# Patient Record
Sex: FEMALE | Race: OTHER | URBAN - METROPOLITAN AREA
[De-identification: names, ages, dates, MRNs, and addresses within clinical notes are randomized per-mention and may not be internally consistent; named-entity substitution may affect disease eponyms.]

---

## 2021-12-16 ENCOUNTER — EMERGENCY (EMERGENCY)
Facility: HOSPITAL | Age: 31
LOS: 0 days | Discharge: HOME | End: 2021-12-16
Attending: STUDENT IN AN ORGANIZED HEALTH CARE EDUCATION/TRAINING PROGRAM | Admitting: STUDENT IN AN ORGANIZED HEALTH CARE EDUCATION/TRAINING PROGRAM
Payer: COMMERCIAL

## 2021-12-16 VITALS
OXYGEN SATURATION: 99 % | DIASTOLIC BLOOD PRESSURE: 84 MMHG | HEART RATE: 78 BPM | RESPIRATION RATE: 18 BRPM | SYSTOLIC BLOOD PRESSURE: 118 MMHG | TEMPERATURE: 98 F | WEIGHT: 235.01 LBS

## 2021-12-16 VITALS
RESPIRATION RATE: 17 BRPM | OXYGEN SATURATION: 99 % | DIASTOLIC BLOOD PRESSURE: 81 MMHG | HEART RATE: 75 BPM | SYSTOLIC BLOOD PRESSURE: 121 MMHG

## 2021-12-16 DIAGNOSIS — R10.31 RIGHT LOWER QUADRANT PAIN: ICD-10-CM

## 2021-12-16 DIAGNOSIS — R10.32 LEFT LOWER QUADRANT PAIN: ICD-10-CM

## 2021-12-16 DIAGNOSIS — F17.200 NICOTINE DEPENDENCE, UNSPECIFIED, UNCOMPLICATED: ICD-10-CM

## 2021-12-16 DIAGNOSIS — R11.2 NAUSEA WITH VOMITING, UNSPECIFIED: ICD-10-CM

## 2021-12-16 LAB
ALBUMIN SERPL ELPH-MCNC: 4 G/DL — SIGNIFICANT CHANGE UP (ref 3.5–5.2)
ALP SERPL-CCNC: 56 U/L — SIGNIFICANT CHANGE UP (ref 30–115)
ALT FLD-CCNC: 12 U/L — SIGNIFICANT CHANGE UP (ref 0–41)
ANION GAP SERPL CALC-SCNC: 15 MMOL/L — HIGH (ref 7–14)
APPEARANCE UR: CLEAR — SIGNIFICANT CHANGE UP
AST SERPL-CCNC: 13 U/L — SIGNIFICANT CHANGE UP (ref 0–41)
BASOPHILS # BLD AUTO: 0.09 K/UL — SIGNIFICANT CHANGE UP (ref 0–0.2)
BASOPHILS NFR BLD AUTO: 0.8 % — SIGNIFICANT CHANGE UP (ref 0–1)
BILIRUB SERPL-MCNC: 0.3 MG/DL — SIGNIFICANT CHANGE UP (ref 0.2–1.2)
BILIRUB UR-MCNC: NEGATIVE — SIGNIFICANT CHANGE UP
BUN SERPL-MCNC: 10 MG/DL — SIGNIFICANT CHANGE UP (ref 10–20)
CALCIUM SERPL-MCNC: 9.3 MG/DL — SIGNIFICANT CHANGE UP (ref 8.5–10.1)
CHLORIDE SERPL-SCNC: 105 MMOL/L — SIGNIFICANT CHANGE UP (ref 98–110)
CO2 SERPL-SCNC: 18 MMOL/L — SIGNIFICANT CHANGE UP (ref 17–32)
COLOR SPEC: COLORLESS — SIGNIFICANT CHANGE UP
CREAT SERPL-MCNC: 0.6 MG/DL — LOW (ref 0.7–1.5)
DIFF PNL FLD: NEGATIVE — SIGNIFICANT CHANGE UP
EOSINOPHIL # BLD AUTO: 0.25 K/UL — SIGNIFICANT CHANGE UP (ref 0–0.7)
EOSINOPHIL NFR BLD AUTO: 2.1 % — SIGNIFICANT CHANGE UP (ref 0–8)
GLUCOSE SERPL-MCNC: 87 MG/DL — SIGNIFICANT CHANGE UP (ref 70–99)
GLUCOSE UR QL: NEGATIVE — SIGNIFICANT CHANGE UP
HCT VFR BLD CALC: 38.1 % — SIGNIFICANT CHANGE UP (ref 37–47)
HGB BLD-MCNC: 12.8 G/DL — SIGNIFICANT CHANGE UP (ref 12–16)
IMM GRANULOCYTES NFR BLD AUTO: 0.4 % — HIGH (ref 0.1–0.3)
KETONES UR-MCNC: NEGATIVE — SIGNIFICANT CHANGE UP
LEUKOCYTE ESTERASE UR-ACNC: NEGATIVE — SIGNIFICANT CHANGE UP
LYMPHOCYTES # BLD AUTO: 27.8 % — SIGNIFICANT CHANGE UP (ref 20.5–51.1)
LYMPHOCYTES # BLD AUTO: 3.31 K/UL — SIGNIFICANT CHANGE UP (ref 1.2–3.4)
MCHC RBC-ENTMCNC: 31.4 PG — HIGH (ref 27–31)
MCHC RBC-ENTMCNC: 33.6 G/DL — SIGNIFICANT CHANGE UP (ref 32–37)
MCV RBC AUTO: 93.4 FL — SIGNIFICANT CHANGE UP (ref 81–99)
MONOCYTES # BLD AUTO: 0.69 K/UL — HIGH (ref 0.1–0.6)
MONOCYTES NFR BLD AUTO: 5.8 % — SIGNIFICANT CHANGE UP (ref 1.7–9.3)
NEUTROPHILS # BLD AUTO: 7.53 K/UL — HIGH (ref 1.4–6.5)
NEUTROPHILS NFR BLD AUTO: 63.1 % — SIGNIFICANT CHANGE UP (ref 42.2–75.2)
NITRITE UR-MCNC: NEGATIVE — SIGNIFICANT CHANGE UP
NRBC # BLD: 0 /100 WBCS — SIGNIFICANT CHANGE UP (ref 0–0)
PH UR: 6.5 — SIGNIFICANT CHANGE UP (ref 5–8)
PLATELET # BLD AUTO: 546 K/UL — HIGH (ref 130–400)
POTASSIUM SERPL-MCNC: 4.3 MMOL/L — SIGNIFICANT CHANGE UP (ref 3.5–5)
POTASSIUM SERPL-SCNC: 4.3 MMOL/L — SIGNIFICANT CHANGE UP (ref 3.5–5)
PROT SERPL-MCNC: 7.2 G/DL — SIGNIFICANT CHANGE UP (ref 6–8)
PROT UR-MCNC: NEGATIVE — SIGNIFICANT CHANGE UP
RBC # BLD: 4.08 M/UL — LOW (ref 4.2–5.4)
RBC # FLD: 13.1 % — SIGNIFICANT CHANGE UP (ref 11.5–14.5)
SODIUM SERPL-SCNC: 138 MMOL/L — SIGNIFICANT CHANGE UP (ref 135–146)
SP GR SPEC: 1.01 — SIGNIFICANT CHANGE UP (ref 1.01–1.03)
UROBILINOGEN FLD QL: SIGNIFICANT CHANGE UP
WBC # BLD: 11.92 K/UL — HIGH (ref 4.8–10.8)
WBC # FLD AUTO: 11.92 K/UL — HIGH (ref 4.8–10.8)

## 2021-12-16 PROCEDURE — 76830 TRANSVAGINAL US NON-OB: CPT | Mod: 26

## 2021-12-16 PROCEDURE — 74177 CT ABD & PELVIS W/CONTRAST: CPT | Mod: 26,MA

## 2021-12-16 PROCEDURE — 99285 EMERGENCY DEPT VISIT HI MDM: CPT

## 2021-12-16 RX ORDER — KETOROLAC TROMETHAMINE 30 MG/ML
15 SYRINGE (ML) INJECTION ONCE
Refills: 0 | Status: DISCONTINUED | OUTPATIENT
Start: 2021-12-16 | End: 2021-12-16

## 2021-12-16 RX ADMIN — Medication 15 MILLIGRAM(S): at 10:18

## 2021-12-16 NOTE — ED PROVIDER NOTE - NS ED ROS FT
Constitutional: Negative for fever, chills, and fatigue.  HENT: Negative for headache  Cardiovascular: Negative for chest pain, and palpitation.  Respiratory: Negative for SOB, .  Gastrointestinal: + N/V/ abdominal pain. Negative for diarrhea, hematochezia, and melena.  Genitourinary: + B/L flank pain and dysuria. Negative for frequency, and hematuria.  Neurological: Negative for dizziness, syncope, and loss of consciousness.  Musculoskeletal: Negative for joint swelling, arthralgias, back pain, neck pain, and calf cramps.  Hematological: Does not bruise/bleed easily.

## 2021-12-16 NOTE — ED PROVIDER NOTE - PATIENT PORTAL LINK FT
You can access the FollowMyHealth Patient Portal offered by [facility] by registering at the following website: [email or web address]. By joining [employer]’s FollowMyHealth portal, you will also be able to view your health information using other applications (apps) compatible with our system.

## 2021-12-16 NOTE — ED PROVIDER NOTE - CLINICAL SUMMARY MEDICAL DECISION MAKING FREE TEXT BOX
I personally evaluated the patient. I reviewed the Resident’s or Physician Assistant’s note (as assigned above), and agree with the findings and plan except as documented in my note. Patient evaluated for abd pain. Labs, CT, TVUS performed in the ED. Given medication for pain with resolution of sx. Abd soft, nontender, no peritoneal signs. I have fully discussed the medical management and delivery of care with the patient. I have discussed any available labs, imaging and treatment options with the patient. Patient confirms understanding and has been given detailed return precautions. Patient instructed to return to the ED should symptoms persist or worsen. Patient has demonstrated capacity and has verbalized understanding. Patient is well appearing upon discharge.

## 2021-12-16 NOTE — ED ADULT NURSE NOTE - OBJECTIVE STATEMENT
patient c/o b/l flank pain radiating around to b/l lower abdomen for 2 days. denies n/v/d/fevers/chills

## 2021-12-16 NOTE — ED PROVIDER NOTE - PROVIDER TOKENS
FREE:[LAST:[Please make sure to follow up with your primary care doctor and gynocologist in 3 days],PHONE:[(   )    -],FAX:[(   )    -]]

## 2021-12-16 NOTE — ED PROVIDER NOTE - PROGRESS NOTE DETAILS
Labs, CT, and US unremarkable with no evidence of kidney stone or UTI. Toradol given in the ER; symptom has improved significantly. Patient is well appearing with normal vitals and clinically stable to be discharged. Discussed D/C instruction. Strict return to ER precautions discussed. Patient understands discharge instruction, ER return precautions, and follow-up instructions without further questions.

## 2021-12-16 NOTE — ED PROVIDER NOTE - CARE PROVIDER_API CALL
Please make sure to follow up with your primary care doctor and gynocologist in 3 days,   Phone: (   )    -  Fax: (   )    -  Follow Up Time:

## 2022-03-28 ENCOUNTER — TRANSCRIPTION ENCOUNTER (OUTPATIENT)
Age: 32
End: 2022-03-28

## 2022-06-23 ENCOUNTER — NON-APPOINTMENT (OUTPATIENT)
Age: 32
End: 2022-06-23

## 2022-09-29 ENCOUNTER — NON-APPOINTMENT (OUTPATIENT)
Age: 32
End: 2022-09-29

## 2022-10-27 ENCOUNTER — NON-APPOINTMENT (OUTPATIENT)
Age: 32
End: 2022-10-27

## 2022-12-11 ENCOUNTER — NON-APPOINTMENT (OUTPATIENT)
Age: 32
End: 2022-12-11

## 2023-01-24 ENCOUNTER — NON-APPOINTMENT (OUTPATIENT)
Age: 33
End: 2023-01-24

## 2023-02-01 ENCOUNTER — NON-APPOINTMENT (OUTPATIENT)
Age: 33
End: 2023-02-01

## 2023-02-06 ENCOUNTER — EMERGENCY (EMERGENCY)
Facility: HOSPITAL | Age: 33
LOS: 1 days | Discharge: HOME | End: 2023-02-06
Attending: EMERGENCY MEDICINE
Payer: COMMERCIAL

## 2023-02-06 ENCOUNTER — OUTPATIENT (OUTPATIENT)
Dept: OUTPATIENT SERVICES | Facility: HOSPITAL | Age: 33
LOS: 1 days | End: 2023-02-06
Payer: COMMERCIAL

## 2023-02-06 VITALS
SYSTOLIC BLOOD PRESSURE: 122 MMHG | RESPIRATION RATE: 18 BRPM | OXYGEN SATURATION: 100 % | TEMPERATURE: 98 F | HEART RATE: 84 BPM | DIASTOLIC BLOOD PRESSURE: 62 MMHG

## 2023-02-06 DIAGNOSIS — K08.89 OTHER SPECIFIED DISORDERS OF TEETH AND SUPPORTING STRUCTURES: ICD-10-CM

## 2023-02-06 DIAGNOSIS — K02.9 DENTAL CARIES, UNSPECIFIED: ICD-10-CM

## 2023-02-06 DIAGNOSIS — K09.1 DEVELOPMENTAL (NONODONTOGENIC) CYSTS OF ORAL REGION: ICD-10-CM

## 2023-02-06 DIAGNOSIS — R22.0 LOCALIZED SWELLING, MASS AND LUMP, HEAD: ICD-10-CM

## 2023-02-06 PROCEDURE — D0140: CPT

## 2023-02-06 PROCEDURE — D0220: CPT

## 2023-02-06 PROCEDURE — 99284 EMERGENCY DEPT VISIT MOD MDM: CPT

## 2023-02-06 PROCEDURE — 99284 EMERGENCY DEPT VISIT MOD MDM: CPT | Mod: 25

## 2023-02-06 PROCEDURE — D7140: CPT

## 2023-02-06 NOTE — ED PROVIDER NOTE - ATTENDING APP SHARED VISIT CONTRIBUTION OF CARE
32-year-old female previously healthy presents with left upper tooth pressure and pain. She reports onset 1 week ago, worsened with swelling for the past 2 days. Unchanged with amoxicillin course prescribed at urgent care. Denies fever, chills, and no other symptoms. On exam, afebrile, hemodynamically stable, saturating well on room air, NAD, well appearing, walking comfortably in ED, no WOB, speaking full sentences, head NCAT, very mild left upper cheek swelling noted, pupils equal, EOMI, anicteric, MMM, breathing comfortably on room air, AAO, CN's 3-12 grossly intact, WERNER spontaneously, skin warm, well perfused. No evidence of airway or systemic involvement to warrant labs, imaging or airway management. Exam concerning for small dental abscess. Sent to dental clinic for further management.

## 2023-02-06 NOTE — ED PROVIDER NOTE - OBJECTIVE STATEMENT
32-year-old female no past medical history presents to the ED complaining of left upper dental pain for 1 week.  Patient states was seen at urgent care and started on amoxicillin.  No fever, chills or difficulty swallowing.

## 2023-02-06 NOTE — CONSULT NOTE ADULT - ASSESSMENT
Patient is a 32y old  Female who presents with a chief complaint of     HPI: Upper dental pain for the past 2 days      PAST MEDICAL & SURGICAL HISTORY:  No pertinent past medical history      MEDICATIONS  (STANDING):    MEDICATIONS  (PRN):      Allergies    No Known Allergies    Intolerances        FAMILY HISTORY:      *SOCIAL HISTORY: (   ) Tobacco; (   ) ETOH    *Last Dental Visit:    Vital Signs Last 24 Hrs  T(C): 36.4 (06 Feb 2023 11:05), Max: 36.4 (06 Feb 2023 11:05)  T(F): 97.5 (06 Feb 2023 11:05), Max: 97.5 (06 Feb 2023 11:05)  HR: 84 (06 Feb 2023 11:05) (84 - 84)  BP: 122/62 (06 Feb 2023 11:05) (122/62 - 122/62)  BP(mean): --  RR: 18 (06 Feb 2023 11:05) (18 - 18)  SpO2: 100% (06 Feb 2023 11:05) (100% - 100%)    Parameters below as of 06 Feb 2023 11:05  Patient On (Oxygen Delivery Method): room air        LABS:                  EOE:  TMJ ( - ) clicks                     ( - ) pops                     ( - ) crepitus             Mandible <<FROM>>             Facial bones and MOM <<grossly intact>>             ( - ) trismus             ( - ) lymphadenopathy             ( - ) swelling             ( - ) asymmetry             ( - ) palpation             ( - ) dyspnea             ( - ) dysphagia             ( - ) loss of consciousness    IOE:  <<permanent>> dentition: <<grossly intact>> OR <<multiple carious teeth>>           hard/soft palate: <<No pathology noted>>           tongue/FOM <<No pathology noted>>           labial/buccal mucosa <<No pathology noted>>           ( + ) percussion           ( - ) palpation           ( - ) swelling            ( - ) abscess           ( - ) sinus tract    Dentition present: <<y>>  Mobility: <<n>>  Caries: << y>>         *DENTAL RADIOGRAPHS: Periapical #14    RADIOLOGY & ADDITIONAL STUDIES:    *ASSESSMENT: Nonrestorable tooth #14      *PLAN: Nonsurgical extraction #14    PROCEDURE:   Verbal and written consent given.  Anesthesia: 2 carpule of 4% Septocaine (1:100,000 epinephrine) via local infiltration and buccal infiltration  Treatment: Treatment consequences explained as per OS sheet dated 7/13/00. Risks and benefits discussed. Consent obtained and side site completed. Administered anesthetic as described. Elevated and luxated tooth #14  . Curettaged and irrigated site with saline. Hemostasis achieved. Post-operative radiographs taken. Post-operative instructions given.    Prescribed: Amoxicillin 500 mg q8h x 7days    RECOMMENDATIONS:  1) Complete antiobiotic course as prescribed and analgesics as necessary for pain  2) Dental F/U with outpatient dentist for comprehensive dental care.   3) If any difficulty swallowing/breathing, fever occur, return to ER.     Resident Name: Donavon Stephens, pager #[phone]

## 2023-05-17 DIAGNOSIS — K08.9 DISORDER OF TEETH AND SUPPORTING STRUCTURES, UNSPECIFIED: ICD-10-CM

## 2023-05-19 DIAGNOSIS — K02.9 DENTAL CARIES, UNSPECIFIED: ICD-10-CM

## 2023-05-19 DIAGNOSIS — R22.0 LOCALIZED SWELLING, MASS AND LUMP, HEAD: ICD-10-CM

## 2023-05-19 DIAGNOSIS — K08.89 OTHER SPECIFIED DISORDERS OF TEETH AND SUPPORTING STRUCTURES: ICD-10-CM

## 2023-09-04 ENCOUNTER — APPOINTMENT (EMERGENCY)
Dept: ULTRASOUND IMAGING | Facility: HOSPITAL | Age: 33
End: 2023-09-04
Payer: MEDICAID

## 2023-09-04 ENCOUNTER — APPOINTMENT (EMERGENCY)
Dept: CT IMAGING | Facility: HOSPITAL | Age: 33
End: 2023-09-04
Payer: MEDICAID

## 2023-09-04 ENCOUNTER — HOSPITAL ENCOUNTER (EMERGENCY)
Facility: HOSPITAL | Age: 33
Discharge: HOME | End: 2023-09-04
Attending: FAMILY MEDICINE
Payer: MEDICAID

## 2023-09-04 VITALS
TEMPERATURE: 97.9 F | RESPIRATION RATE: 20 BRPM | DIASTOLIC BLOOD PRESSURE: 65 MMHG | HEART RATE: 64 BPM | OXYGEN SATURATION: 98 % | WEIGHT: 225 LBS | SYSTOLIC BLOOD PRESSURE: 141 MMHG | BODY MASS INDEX: 38.41 KG/M2 | HEIGHT: 64 IN

## 2023-09-04 DIAGNOSIS — D25.9 UTERINE FIBROID: ICD-10-CM

## 2023-09-04 DIAGNOSIS — N83.202 CYST OF LEFT OVARY: Primary | ICD-10-CM

## 2023-09-04 LAB
ALBUMIN SERPL BCP-MCNC: 3.8 G/DL (ref 3.5–5)
ALP SERPL-CCNC: 44 U/L (ref 34–104)
ALT SERPL W P-5'-P-CCNC: 26 U/L (ref 7–52)
ANION GAP SERPL CALCULATED.3IONS-SCNC: 9 MMOL/L
AST SERPL W P-5'-P-CCNC: 21 U/L (ref 13–39)
BACTERIA UR QL AUTO: NORMAL /HPF
BASOPHILS # BLD AUTO: 0.09 THOUSANDS/ÂΜL (ref 0–0.1)
BASOPHILS NFR BLD AUTO: 1 % (ref 0–1)
BILIRUB SERPL-MCNC: 0.85 MG/DL (ref 0.2–1)
BILIRUB UR QL STRIP: NEGATIVE
BUN SERPL-MCNC: 14 MG/DL (ref 5–25)
CALCIUM SERPL-MCNC: 8.8 MG/DL (ref 8.4–10.2)
CHLORIDE SERPL-SCNC: 105 MMOL/L (ref 96–108)
CLARITY UR: CLEAR
CO2 SERPL-SCNC: 22 MMOL/L (ref 21–32)
COLOR UR: YELLOW
CREAT SERPL-MCNC: 0.68 MG/DL (ref 0.6–1.3)
EOSINOPHIL # BLD AUTO: 0.13 THOUSAND/ÂΜL (ref 0–0.61)
EOSINOPHIL NFR BLD AUTO: 1 % (ref 0–6)
ERYTHROCYTE [DISTWIDTH] IN BLOOD BY AUTOMATED COUNT: 13.2 % (ref 11.6–15.1)
GFR SERPL CREATININE-BSD FRML MDRD: 116 ML/MIN/1.73SQ M
GLUCOSE SERPL-MCNC: 96 MG/DL (ref 65–140)
GLUCOSE UR STRIP-MCNC: NEGATIVE MG/DL
HCG SERPL QL: NEGATIVE
HCT VFR BLD AUTO: 38.9 % (ref 34.8–46.1)
HGB BLD-MCNC: 12.8 G/DL (ref 11.5–15.4)
HGB UR QL STRIP.AUTO: ABNORMAL
IMM GRANULOCYTES # BLD AUTO: 0.03 THOUSAND/UL (ref 0–0.2)
IMM GRANULOCYTES NFR BLD AUTO: 0 % (ref 0–2)
KETONES UR STRIP-MCNC: NEGATIVE MG/DL
LEUKOCYTE ESTERASE UR QL STRIP: NEGATIVE
LIPASE SERPL-CCNC: 6 U/L (ref 11–82)
LYMPHOCYTES # BLD AUTO: 3.61 THOUSANDS/ÂΜL (ref 0.6–4.47)
LYMPHOCYTES NFR BLD AUTO: 34 % (ref 14–44)
MCH RBC QN AUTO: 31.2 PG (ref 26.8–34.3)
MCHC RBC AUTO-ENTMCNC: 32.9 G/DL (ref 31.4–37.4)
MCV RBC AUTO: 95 FL (ref 82–98)
MONOCYTES # BLD AUTO: 1.04 THOUSAND/ÂΜL (ref 0.17–1.22)
MONOCYTES NFR BLD AUTO: 10 % (ref 4–12)
NEUTROPHILS # BLD AUTO: 5.88 THOUSANDS/ÂΜL (ref 1.85–7.62)
NEUTS SEG NFR BLD AUTO: 54 % (ref 43–75)
NITRITE UR QL STRIP: NEGATIVE
NON-SQ EPI CELLS URNS QL MICRO: NORMAL /HPF
NRBC BLD AUTO-RTO: 0 /100 WBCS
PH UR STRIP.AUTO: 5.5 [PH]
PLATELET # BLD AUTO: 488 THOUSANDS/UL (ref 149–390)
PMV BLD AUTO: 9.1 FL (ref 8.9–12.7)
POTASSIUM SERPL-SCNC: 3.3 MMOL/L (ref 3.5–5.3)
PROT SERPL-MCNC: 7 G/DL (ref 6.4–8.4)
PROT UR STRIP-MCNC: NEGATIVE MG/DL
RBC # BLD AUTO: 4.1 MILLION/UL (ref 3.81–5.12)
RBC #/AREA URNS AUTO: NORMAL /HPF
SODIUM SERPL-SCNC: 136 MMOL/L (ref 135–147)
SP GR UR STRIP.AUTO: 1.01
UROBILINOGEN UR QL STRIP.AUTO: 0.2 E.U./DL
WBC # BLD AUTO: 10.78 THOUSAND/UL (ref 4.31–10.16)
WBC #/AREA URNS AUTO: NORMAL /HPF

## 2023-09-04 PROCEDURE — 84703 CHORIONIC GONADOTROPIN ASSAY: CPT

## 2023-09-04 PROCEDURE — 96374 THER/PROPH/DIAG INJ IV PUSH: CPT

## 2023-09-04 PROCEDURE — 96361 HYDRATE IV INFUSION ADD-ON: CPT

## 2023-09-04 PROCEDURE — 76830 TRANSVAGINAL US NON-OB: CPT

## 2023-09-04 PROCEDURE — 99284 EMERGENCY DEPT VISIT MOD MDM: CPT

## 2023-09-04 PROCEDURE — 96375 TX/PRO/DX INJ NEW DRUG ADDON: CPT

## 2023-09-04 PROCEDURE — 85025 COMPLETE CBC W/AUTO DIFF WBC: CPT

## 2023-09-04 PROCEDURE — 76856 US EXAM PELVIC COMPLETE: CPT

## 2023-09-04 PROCEDURE — 36415 COLL VENOUS BLD VENIPUNCTURE: CPT

## 2023-09-04 PROCEDURE — 81001 URINALYSIS AUTO W/SCOPE: CPT

## 2023-09-04 PROCEDURE — 83690 ASSAY OF LIPASE: CPT

## 2023-09-04 PROCEDURE — 74177 CT ABD & PELVIS W/CONTRAST: CPT

## 2023-09-04 PROCEDURE — 80053 COMPREHEN METABOLIC PANEL: CPT

## 2023-09-04 RX ORDER — ONDANSETRON 2 MG/ML
4 INJECTION INTRAMUSCULAR; INTRAVENOUS ONCE
Status: COMPLETED | OUTPATIENT
Start: 2023-09-04 | End: 2023-09-04

## 2023-09-04 RX ORDER — POTASSIUM CHLORIDE 20 MEQ/1
40 TABLET, EXTENDED RELEASE ORAL ONCE
Status: COMPLETED | OUTPATIENT
Start: 2023-09-04 | End: 2023-09-04

## 2023-09-04 RX ORDER — NAPROXEN 500 MG/1
500 TABLET ORAL 2 TIMES DAILY WITH MEALS
Qty: 30 TABLET | Refills: 0 | Status: SHIPPED | OUTPATIENT
Start: 2023-09-04

## 2023-09-04 RX ORDER — OXYCODONE HYDROCHLORIDE 10 MG/1
5 TABLET ORAL EVERY 4 HOURS PRN
Qty: 2 TABLET | Refills: 0 | Status: SHIPPED | OUTPATIENT
Start: 2023-09-04

## 2023-09-04 RX ADMIN — ONDANSETRON 4 MG: 2 INJECTION INTRAMUSCULAR; INTRAVENOUS at 08:55

## 2023-09-04 RX ADMIN — MORPHINE SULFATE 2 MG: 2 INJECTION, SOLUTION INTRAMUSCULAR; INTRAVENOUS at 08:55

## 2023-09-04 RX ADMIN — IOHEXOL 100 ML: 350 INJECTION, SOLUTION INTRAVENOUS at 09:45

## 2023-09-04 RX ADMIN — SODIUM CHLORIDE 500 ML: 0.9 INJECTION, SOLUTION INTRAVENOUS at 08:45

## 2023-09-04 RX ADMIN — POTASSIUM CHLORIDE 40 MEQ: 1500 TABLET, EXTENDED RELEASE ORAL at 12:26

## 2023-09-04 NOTE — DISCHARGE INSTRUCTIONS
Please take Tylenol and Motrin as needed for pain control and reserve narcotics for breakthrough pain. Do not drive on this medication or operate heavy machinery. Please schedule appointment with gynecology for further management and monitoring of your diagnosis. You may also benefit from using heating pads for your pain. If you develop any new, concerning, worsening symptoms please return to the emergency department for reevaluation.

## 2023-09-04 NOTE — ED PROVIDER NOTES
History  Chief Complaint   Patient presents with   • Abdominal Pain     Acute onset of suprapubic pain, sharp, onset 0630 hours today. Patient reports chronic nausea/pain after eating and states "I'm suspicious for an ulcer. I just moved here from [place] and shouldve had it taken care of a long time ago." Reports 1 episode vomitting this morning. 49-year-old female presents to the emergency department for evaluation of sudden onset sharp suprapubic abdominal pain that began at 630 this morning. Patient also reporting nausea and vomiting associated with her symptoms. Had one episode of nonbloody emesis this morning. Describes it as a cramping sensation in her pelvic area/suprapubic area. Does feel like it is more off to the left side. No history of abdominal surgeries. No history of similar symptoms. Denies pregnancy. Last menstrual period was a couple weeks ago. Denies chest pain, shortness of breath, fevers, chills, headache, dizziness, numbness/tingling, weakness, vaginal bleeding/discharge, hematuria, dysuria, blood in stool, black stools. She does feel like the pain radiates into her back and along her left flank. History provided by:  Patient   used: No    Abdominal Pain  Associated symptoms: nausea and vomiting    Associated symptoms: no chest pain, no chills, no cough, no dysuria, no fever, no hematuria, no shortness of breath, no sore throat, no vaginal bleeding and no vaginal discharge        None       History reviewed. No pertinent past medical history. History reviewed. No pertinent surgical history. History reviewed. No pertinent family history. I have reviewed and agree with the history as documented.     E-Cigarette/Vaping     E-Cigarette/Vaping Substances     Social History     Tobacco Use   • Smoking status: Never   • Smokeless tobacco: Never   Substance Use Topics   • Alcohol use: Yes     Comment: rare   • Drug use: Yes     Types: Marijuana Comment: medical card       Review of Systems   Constitutional: Negative for chills and fever. HENT: Negative for ear pain and sore throat. Eyes: Negative for pain and visual disturbance. Respiratory: Negative for cough and shortness of breath. Cardiovascular: Negative for chest pain and palpitations. Gastrointestinal: Positive for abdominal pain, nausea and vomiting. Genitourinary: Positive for pelvic pain. Negative for dysuria, hematuria, vaginal bleeding and vaginal discharge. Musculoskeletal: Negative for arthralgias and back pain. Skin: Negative for color change and rash. Neurological: Negative for dizziness, seizures, syncope and headaches. All other systems reviewed and are negative. Physical Exam  Physical Exam  Vitals and nursing note reviewed. Constitutional:       General: She is in acute distress. Appearance: Normal appearance. She is well-developed. She is obese. She is not ill-appearing. HENT:      Head: Normocephalic and atraumatic. Right Ear: Tympanic membrane and external ear normal.      Left Ear: Tympanic membrane and external ear normal.      Nose: Nose normal.      Mouth/Throat:      Mouth: Mucous membranes are moist.      Pharynx: Oropharynx is clear. No oropharyngeal exudate or posterior oropharyngeal erythema. Eyes:      General: No scleral icterus. Right eye: No discharge. Left eye: No discharge. Extraocular Movements: Extraocular movements intact. Conjunctiva/sclera: Conjunctivae normal.      Pupils: Pupils are equal, round, and reactive to light. Cardiovascular:      Rate and Rhythm: Normal rate and regular rhythm. Pulses: Normal pulses. Heart sounds: Normal heart sounds. No murmur heard. Pulmonary:      Effort: Pulmonary effort is normal. No respiratory distress. Breath sounds: Normal breath sounds. No wheezing or rales. Chest:      Chest wall: No tenderness. Abdominal:      General: Abdomen is flat. Bowel sounds are normal.      Palpations: Abdomen is soft. Tenderness: There is abdominal tenderness in the suprapubic area and left lower quadrant. There is no right CVA tenderness or left CVA tenderness. Genitourinary:     Comments: Deferred  Musculoskeletal:         General: No signs of injury. Normal range of motion. Cervical back: Normal range of motion and neck supple. No tenderness. Right lower leg: No edema. Left lower leg: No edema. Skin:     General: Skin is warm and dry. Findings: No rash. Neurological:      General: No focal deficit present. Mental Status: She is alert and oriented to person, place, and time. Mental status is at baseline. Motor: No weakness. Gait: Gait normal.   Psychiatric:         Mood and Affect: Mood normal.         Behavior: Behavior normal.         Thought Content:  Thought content normal.         Vital Signs  ED Triage Vitals [09/04/23 0825]   Temperature Pulse Respirations Blood Pressure SpO2   97.9 °F (36.6 °C) 64 20 141/65 98 %      Temp Source Heart Rate Source Patient Position - Orthostatic VS BP Location FiO2 (%)   Tympanic Monitor Lying Left arm --      Pain Score       10 - Worst Possible Pain           Vitals:    09/04/23 0825   BP: 141/65   Pulse: 64   Patient Position - Orthostatic VS: Lying         Visual Acuity      ED Medications  Medications   ondansetron (ZOFRAN) injection 4 mg (4 mg Intravenous Given 9/4/23 0855)   morphine injection 2 mg (2 mg Intravenous Given 9/4/23 0855)   sodium chloride 0.9 % bolus 500 mL (0 mL Intravenous Stopped 9/4/23 0938)   iohexol (OMNIPAQUE) 350 MG/ML injection (MULTI-DOSE) 100 mL (100 mL Intravenous Given 9/4/23 0945)   potassium chloride (K-DUR,KLOR-CON) CR tablet 40 mEq (40 mEq Oral Given 9/4/23 1226)       Diagnostic Studies  Results Reviewed     Procedure Component Value Units Date/Time    Urine Microscopic [[id number]]  (Normal) Collected: 09/04/23 0938    Lab Status: Final result Specimen: Urine, Clean Catch Updated: 09/04/23 1013     RBC, UA 1-2 /hpf      WBC, UA 0-1 /hpf      Epithelial Cells Occasional /hpf      Bacteria, UA None Seen /hpf     UA w Reflex to Microscopic w Reflex to Culture [[id number]]  (Abnormal) Collected: 09/04/23 0938    Lab Status: Final result Specimen: Urine, Clean Catch Updated: 09/04/23 0945     Color, UA Yellow     Clarity, UA Clear     Specific Gravity, UA 1.010     pH, UA 5.5     Leukocytes, UA Negative     Nitrite, UA Negative     Protein, UA Negative mg/dl      Glucose, UA Negative mg/dl      Ketones, UA Negative mg/dl      Urobilinogen, UA 0.2 E.U./dl      Bilirubin, UA Negative     Occult Blood, UA Trace-Intact    hCG, qualitative pregnancy [[id number]]  (Normal) Collected: 09/04/23 0848    Lab Status: Final result Specimen: Blood from Arm, Right Updated: 09/04/23 0932     Preg, Serum Negative    CMP [[id number]]  (Abnormal) Collected: 09/04/23 0848    Lab Status: Final result Specimen: Blood from Arm, Right Updated: 09/04/23 0923     Sodium 136 mmol/L      Potassium 3.3 mmol/L      Chloride 105 mmol/L      CO2 22 mmol/L      ANION GAP 9 mmol/L      BUN 14 mg/dL      Creatinine 0.68 mg/dL      Glucose 96 mg/dL      Calcium 8.8 mg/dL      AST 21 U/L      ALT 26 U/L      Alkaline Phosphatase 44 U/L      Total Protein 7.0 g/dL      Albumin 3.8 g/dL      Total Bilirubin 0.85 mg/dL      eGFR 116 ml/min/1.73sq m     Narrative:      [place] guidelines for Chronic Kidney Disease (CKD):   •  Stage 1 with normal or high GFR (GFR > 90 mL/min/1.73 square meters)  •  Stage 2 Mild CKD (GFR = 60-89 mL/min/1.73 square meters)  •  Stage 3A Moderate CKD (GFR = 45-59 mL/min/1.73 square meters)  •  Stage 3B Moderate CKD (GFR = 30-44 mL/min/1.73 square meters)  •  Stage 4 Severe CKD (GFR = 15-29 mL/min/1.73 square meters)  •  Stage 5 End Stage CKD (GFR <15 mL/min/1.73 square meters)  Note: GFR calculation is accurate only with a steady state creatinine Lipase [[id number]]  (Abnormal) Collected: 09/04/23 0848    Lab Status: Final result Specimen: Blood from Arm, Right Updated: 09/04/23 0923     Lipase 6 u/L     CBC and differential [[id number]]  (Abnormal) Collected: 09/04/23 0848    Lab Status: Final result Specimen: Blood from Arm, Right Updated: 09/04/23 0906     WBC 10.78 Thousand/uL      RBC 4.10 Million/uL      Hemoglobin 12.8 g/dL      Hematocrit 38.9 %      MCV 95 fL      MCH 31.2 pg      MCHC 32.9 g/dL      RDW 13.2 %      MPV 9.1 fL      Platelets 419 Thousands/uL      nRBC 0 /100 WBCs      Neutrophils Relative 54 %      Immat GRANS % 0 %      Lymphocytes Relative 34 %      Monocytes Relative 10 %      Eosinophils Relative 1 %      Basophils Relative 1 %      Neutrophils Absolute 5.88 Thousands/µL      Immature Grans Absolute 0.03 Thousand/uL      Lymphocytes Absolute 3.61 Thousands/µL      Monocytes Absolute 1.04 Thousand/µL      Eosinophils Absolute 0.13 Thousand/µL      Basophils Absolute 0.09 Thousands/µL                  US pelvis complete w transvaginal   Final Result by Sadia Humphreys MD (09/04 1202)         1. Simple appearing 4.3 cm left ovarian cyst appears somewhat decompressed perhaps reflecting recent rupture. 2. Small amount of free fluid in the pelvic cul-de-sac.   3. 1.2 cm left ovarian cyst with single thin nonvascular septation. 4. Small uterine fibroid. Workstation performed: [id number]         CT Abdomen pelvis with contrast   Final Result by Enriqueta Ramos MD (09/04 1003)      No acute intra-abdominal pathology. 4.2 cm left ovarian cyst. Recommend correlation with dedicated pelvic ultrasound. Small pelvic free fluid, probably physiologic.             Workstation performed: [id number]                    Procedures  Procedures         ED Course  ED Course as of 09/07/23 1837   Mon Sep 04, 2023   8997 PREGNANCY, SERUM: Negative  Marked ready for CT   1002 5cm ovarian cystic structure with mild adnexal fluid visualized on CT in L adnexa where patient's pain is. Pelvic US ordered to r/o torsion given the severity of patient's pain. 1026 CT: No acute intra-abdominal pathology.     4.2 cm left ovarian cyst. Recommend correlation with dedicated pelvic ultrasound.     Small pelvic free fluid, probably physiologic.      1104 US tech at bedside   1140 Vascular flow noted to both ovaries on technical findings. [address] [facility] [address]: 1. Simple appearing 4.3 cm left ovarian cyst appears somewhat decompressed perhaps reflecting recent rupture. 2. Small amount of free fluid in the pelvic cul-de-sac.  3. 1.2 cm left ovarian cyst with single thin nonvascular septation. 4. Small uterine fibroid. SBIRT 22yo+    Flowsheet Row Most Recent Value   Initial Alcohol Screen: US AUDIT-C     1. How often do you have a drink containing alcohol? 1 Filed at: 09/04/2023 0828   2. How many drinks containing alcohol do you have on a typical day you are drinking? 1 Filed at: 09/04/2023 0828   3b. FEMALE Any Age, or MALE 65+: How often do you have 4 or more drinks on one occassion? 0 Filed at: 09/04/2023 8683   Audit-C Score 2 Filed at: 09/04/2023 9871   JOSHUA: How many times in the past year have you. .. Used an illegal drug or used a prescription medication for non-medical reasons? Never Filed at: 09/04/2023 5953                    Medical Decision Making  79-year-old female presenting to the emergency department for evaluation of suprapubic pain and nausea/vomiting that began suddenly this morning at 630. Vital signs are stable. Patient does appear to be uncomfortable in the ED bed. Differential:. Ovarian cyst, ovarian torsion, ectopic pregnancy, pregnancy, PID, UTI/pyelonephritis, kidney stone, appendicitis, diverticulitis, small bowel obstruction, ovarian cyst rupture. Considered sepsis however patient's vitals are stable and patient is not septic appearing.     Proceed with abdominal work-up. Negative pregnancy. Overall blood work stable. Mild hypokalemia replaced with oral potassium. CT showing a large left-sided ovarian cyst.  Will proceed with pelvic ultrasound to rule out ovarian torsion. Although ultrasound performed showing no evidence of torsion however it does appear that there may have been a recent rupture of the large left ovarian cyst.  Small amount of free fluid in the pelvic sac but stable appearing. Small uterine fibroid also visualized. Ovarian cyst does explain patient's symptoms well. We will proceed with outpatient management with follow-up with gynecology for resolution of cyst.  Recommended Tylenol & Motrin for pain control and to use heating pads as well. Patient tolerating p.o. without difficulty. Patient in agreement with the plan and is amenable to discharge at this time. Considered hospitalization however patient is stable for outpatient management. Strict return precautions discussed. Patient stable at time of discharge. Referral provided to gynecology in the area. Cyst of left ovary: acute illness or injury  Uterine fibroid: acute illness or injury  Amount and/or Complexity of Data Reviewed  Labs: ordered. Decision-making details documented in ED Course. Details: Unremarkable. Mild hypokalemia replaced with oral potassium. Radiology: ordered. Details: CT: No acute intra-abdominal pathology.     4.2 cm left ovarian cyst. Recommend correlation with dedicated pelvic ultrasound.     Small pelvic free fluid, probably physiologic. Ultrasound: 1. Simple appearing 4.3 cm left ovarian cyst appears somewhat decompressed perhaps reflecting recent rupture. 2. Small amount of free fluid in the pelvic cul-de-sac.  3. 1.2 cm left ovarian cyst with single thin nonvascular septation. 4. Small uterine fibroid. Risk  Prescription drug management.           Disposition  Final diagnoses:   Cyst of left ovary   Uterine fibroid     Time reflects when diagnosis was documented in both MDM as applicable and the Disposition within this note     Time User Action Codes Description Comment    9/4/2023 12:04 PM Mei Coker Add [V47.869] Cyst of left ovary     9/4/2023 12:05 PM Mei Coker Add [D25.9] Uterine fibroid       ED Disposition     ED Disposition   Discharge    Condition   Stable    Date/Time   Mon Sep 4, 2023 12:22 PM    [facility] discharge to home/self care.                Follow-up Information     Follow up With Specialties Details Why [facility] Emergency Department Emergency Medicine Go to  If symptoms worsen [address] [place] [address] Dr Abdullahi Banner [id number] [phone] [address] [facility] Emergency Department, [address] [facility], [address] [place] [address]    [address] [facility] Gynecology Schedule an appointment as soon as possible for a visit  follow up for further evaluation of symptoms [address] [facility] [address] [id number]  [phone]          Discharge Medication List as of 9/4/2023 12:22 PM      START taking these medications    Details   naproxen (Naprosyn) 500 mg tablet Take 1 tablet (500 mg total) by mouth 2 (two) times a day with meals, Starting Mon 9/4/2023, Normal      oxyCODONE (ROXICODONE) 10 MG TABS Take 0.5 tablets (5 mg total) by mouth every 4 (four) hours as needed for moderate pain for up to 4 doses Max Daily Amount: 30 mg, Starting Mon 9/4/2023, Normal                 PDMP Review     None          ED Provider  Electronically Signed by           Tom Cueva PA-C  09/07/23 6988

## 2023-09-04 NOTE — Clinical Note
Nat Alcala was seen and treated in our emergency department on 9/4/2023. Diagnosis:     Deb Mcpherson  may return to work on return date. She may return on this date: 09/07/2023         If you have any questions or concerns, please don't hesitate to call.       Denzel Murillo PA-C    ______________________________           _______________          _______________  Hospital Representative                              Date                                Time

## 2023-10-25 ENCOUNTER — APPOINTMENT (EMERGENCY)
Dept: ULTRASOUND IMAGING | Facility: HOSPITAL | Age: 33
End: 2023-10-25
Payer: MEDICAID

## 2023-10-25 ENCOUNTER — APPOINTMENT (EMERGENCY)
Dept: CT IMAGING | Facility: HOSPITAL | Age: 33
End: 2023-10-25
Payer: MEDICAID

## 2023-10-25 ENCOUNTER — HOSPITAL ENCOUNTER (EMERGENCY)
Facility: HOSPITAL | Age: 33
Discharge: HOME | End: 2023-10-25
Attending: STUDENT IN AN ORGANIZED HEALTH CARE EDUCATION/TRAINING PROGRAM
Payer: MEDICAID

## 2023-10-25 VITALS
HEIGHT: 64 IN | WEIGHT: 226.41 LBS | HEART RATE: 61 BPM | TEMPERATURE: 98.6 F | BODY MASS INDEX: 38.65 KG/M2 | RESPIRATION RATE: 18 BRPM | DIASTOLIC BLOOD PRESSURE: 61 MMHG | SYSTOLIC BLOOD PRESSURE: 116 MMHG | OXYGEN SATURATION: 98 %

## 2023-10-25 DIAGNOSIS — R10.9 ABDOMINAL PAIN: Primary | ICD-10-CM

## 2023-10-25 LAB
ALBUMIN SERPL BCP-MCNC: 3.9 G/DL (ref 3.5–5)
ALP SERPL-CCNC: 48 U/L (ref 34–104)
ALT SERPL W P-5'-P-CCNC: 12 U/L (ref 7–52)
ANION GAP SERPL CALCULATED.3IONS-SCNC: 6 MMOL/L
AST SERPL W P-5'-P-CCNC: 12 U/L (ref 13–39)
BASOPHILS # BLD AUTO: 0.1 THOUSANDS/ÂΜL (ref 0–0.1)
BASOPHILS NFR BLD AUTO: 1 % (ref 0–1)
BILIRUB SERPL-MCNC: 0.46 MG/DL (ref 0.2–1)
BILIRUB UR QL STRIP: NEGATIVE
BUN SERPL-MCNC: 13 MG/DL (ref 5–25)
CALCIUM SERPL-MCNC: 9.2 MG/DL (ref 8.4–10.2)
CHLORIDE SERPL-SCNC: 104 MMOL/L (ref 96–108)
CLARITY UR: CLEAR
CO2 SERPL-SCNC: 27 MMOL/L (ref 21–32)
COLOR UR: YELLOW
CREAT SERPL-MCNC: 0.55 MG/DL (ref 0.6–1.3)
EOSINOPHIL # BLD AUTO: 0.55 THOUSAND/ÂΜL (ref 0–0.61)
EOSINOPHIL NFR BLD AUTO: 5 % (ref 0–6)
ERYTHROCYTE [DISTWIDTH] IN BLOOD BY AUTOMATED COUNT: 13 % (ref 11.6–15.1)
EXT PREGNANCY TEST URINE: NEGATIVE
EXT. CONTROL: NORMAL
GFR SERPL CREATININE-BSD FRML MDRD: 124 ML/MIN/1.73SQ M
GLUCOSE SERPL-MCNC: 99 MG/DL (ref 65–140)
GLUCOSE UR STRIP-MCNC: NEGATIVE MG/DL
HCT VFR BLD AUTO: 39.4 % (ref 34.8–46.1)
HGB BLD-MCNC: 12.3 G/DL (ref 11.5–15.4)
HGB UR QL STRIP.AUTO: NEGATIVE
IMM GRANULOCYTES # BLD AUTO: 0.04 THOUSAND/UL (ref 0–0.2)
IMM GRANULOCYTES NFR BLD AUTO: 0 % (ref 0–2)
KETONES UR STRIP-MCNC: NEGATIVE MG/DL
LEUKOCYTE ESTERASE UR QL STRIP: NEGATIVE
LIPASE SERPL-CCNC: 23 U/L (ref 11–82)
LYMPHOCYTES # BLD AUTO: 3.49 THOUSANDS/ÂΜL (ref 0.6–4.47)
LYMPHOCYTES NFR BLD AUTO: 31 % (ref 14–44)
MCH RBC QN AUTO: 30.8 PG (ref 26.8–34.3)
MCHC RBC AUTO-ENTMCNC: 31.2 G/DL (ref 31.4–37.4)
MCV RBC AUTO: 99 FL (ref 82–98)
MONOCYTES # BLD AUTO: 0.73 THOUSAND/ÂΜL (ref 0.17–1.22)
MONOCYTES NFR BLD AUTO: 7 % (ref 4–12)
NEUTROPHILS # BLD AUTO: 6.28 THOUSANDS/ÂΜL (ref 1.85–7.62)
NEUTS SEG NFR BLD AUTO: 56 % (ref 43–75)
NITRITE UR QL STRIP: NEGATIVE
NRBC BLD AUTO-RTO: 0 /100 WBCS
PH UR STRIP.AUTO: 7.5 [PH]
PLATELET # BLD AUTO: 479 THOUSANDS/UL (ref 149–390)
PMV BLD AUTO: 8.5 FL (ref 8.9–12.7)
POTASSIUM SERPL-SCNC: 4.1 MMOL/L (ref 3.5–5.3)
PROT SERPL-MCNC: 7.2 G/DL (ref 6.4–8.4)
PROT UR STRIP-MCNC: NEGATIVE MG/DL
RBC # BLD AUTO: 4 MILLION/UL (ref 3.81–5.12)
SODIUM SERPL-SCNC: 137 MMOL/L (ref 135–147)
SP GR UR STRIP.AUTO: 1.01
UROBILINOGEN UR QL STRIP.AUTO: 0.2 E.U./DL
WBC # BLD AUTO: 11.19 THOUSAND/UL (ref 4.31–10.16)

## 2023-10-25 PROCEDURE — 80053 COMPREHEN METABOLIC PANEL: CPT | Performed by: STUDENT IN AN ORGANIZED HEALTH CARE EDUCATION/TRAINING PROGRAM

## 2023-10-25 PROCEDURE — 74177 CT ABD & PELVIS W/CONTRAST: CPT

## 2023-10-25 PROCEDURE — 76830 TRANSVAGINAL US NON-OB: CPT

## 2023-10-25 PROCEDURE — 99283 EMERGENCY DEPT VISIT LOW MDM: CPT

## 2023-10-25 PROCEDURE — 36415 COLL VENOUS BLD VENIPUNCTURE: CPT | Performed by: STUDENT IN AN ORGANIZED HEALTH CARE EDUCATION/TRAINING PROGRAM

## 2023-10-25 PROCEDURE — G1004 CDSM NDSC: HCPCS

## 2023-10-25 PROCEDURE — 85025 COMPLETE CBC W/AUTO DIFF WBC: CPT | Performed by: STUDENT IN AN ORGANIZED HEALTH CARE EDUCATION/TRAINING PROGRAM

## 2023-10-25 PROCEDURE — 76856 US EXAM PELVIC COMPLETE: CPT

## 2023-10-25 PROCEDURE — 83690 ASSAY OF LIPASE: CPT | Performed by: STUDENT IN AN ORGANIZED HEALTH CARE EDUCATION/TRAINING PROGRAM

## 2023-10-25 PROCEDURE — 99285 EMERGENCY DEPT VISIT HI MDM: CPT | Performed by: STUDENT IN AN ORGANIZED HEALTH CARE EDUCATION/TRAINING PROGRAM

## 2023-10-25 PROCEDURE — 81025 URINE PREGNANCY TEST: CPT | Performed by: STUDENT IN AN ORGANIZED HEALTH CARE EDUCATION/TRAINING PROGRAM

## 2023-10-25 PROCEDURE — 81003 URINALYSIS AUTO W/O SCOPE: CPT

## 2023-10-25 RX ADMIN — IOHEXOL 100 ML: 350 INJECTION, SOLUTION INTRAVENOUS at 08:52

## 2023-10-25 NOTE — ED PROVIDER NOTES
History  Chief Complaint   Patient presents with    Possible UTI     HPI is a 72-year-old female presents the emergency department with 6 hours of cute onset left lower quadrant pain. Patient dates the pain is 10 out of 10 in nature sharp stabbing localized to left lower quadrant with no radiation. She Dors a history of ovarian cyst denies any intra-abdominal surgeries or other intra-abdominal pathology. Denies any nausea vomiting chest pain shortness of breath. Denies any likelihood of being pregnant. Prior to Admission Medications   Prescriptions Last Dose Informant Patient Reported? Taking?   naproxen (Naprosyn) 500 mg tablet   No No   Sig: Take 1 tablet (500 mg total) by mouth 2 (two) times a day with meals   oxyCODONE (ROXICODONE) 10 MG TABS   No No   Sig: Take 0.5 tablets (5 mg total) by mouth every 4 (four) hours as needed for moderate pain for up to 4 doses Max Daily Amount: 30 mg      Facility-Administered Medications: None       History reviewed. No pertinent past medical history. History reviewed. No pertinent surgical history. History reviewed. No pertinent family history. I have reviewed and agree with the history as documented. E-Cigarette/Vaping    E-Cigarette Use Never User      E-Cigarette/Vaping Substances     Social History     Tobacco Use    Smoking status: Never    Smokeless tobacco: Never   Vaping Use    Vaping Use: Never used   Substance Use Topics    Alcohol use: Yes     Comment: rare    Drug use: Yes     Types: Marijuana     Comment: medical card       Review of Systems   Constitutional:  Negative for activity change, appetite change, chills, fatigue and fever. HENT:  Negative for congestion, dental problem, drooling, ear discharge, ear pain, facial swelling, postnasal drip, rhinorrhea and sinus pain. Eyes:  Negative for photophobia, pain, discharge and itching. Respiratory:  Negative for apnea, cough, chest tightness and shortness of breath.     Cardiovascular: Negative for chest pain and leg swelling. Gastrointestinal:  Positive for abdominal pain. Negative for abdominal distention, anal bleeding, constipation, diarrhea and nausea. Endocrine: Negative for cold intolerance, heat intolerance and polydipsia. Genitourinary:  Negative for difficulty urinating. Musculoskeletal:  Negative for arthralgias, gait problem, joint swelling and myalgias. Skin:  Negative for color change and pallor. Allergic/Immunologic: Negative for immunocompromised state. Neurological:  Negative for dizziness, seizures, facial asymmetry, weakness, light-headedness, numbness and headaches. Psychiatric/Behavioral:  Negative for agitation, behavioral problems, confusion, decreased concentration and dysphoric mood. All other systems reviewed and are negative. Physical Exam  Physical Exam  Vitals and nursing note reviewed. Constitutional:       General: She is not in acute distress. Appearance: She is well-developed. HENT:      Head: Normocephalic and atraumatic. Eyes:      Conjunctiva/sclera: Conjunctivae normal.      Pupils: Pupils are equal, round, and reactive to light. Cardiovascular:      Rate and Rhythm: Normal rate and regular rhythm. Heart sounds: Normal heart sounds. No murmur heard. No friction rub. Pulmonary:      Effort: Pulmonary effort is normal.      Breath sounds: Normal breath sounds. Abdominal:      General: Bowel sounds are normal.      Palpations: Abdomen is soft. Tenderness: There is abdominal tenderness. Comments: Tenderness to palpation in the left lower quadrant   Musculoskeletal:         General: Normal range of motion. Cervical back: Normal range of motion and neck supple. Skin:     General: Skin is warm. Capillary Refill: Capillary refill takes less than 2 seconds. Neurological:      Mental Status: She is alert and oriented to person, place, and time. Motor: No abnormal muscle tone.       Coordination: Coordination normal.   Psychiatric:         Behavior: Behavior normal.         Thought Content:  Thought content normal.         Vital Signs  ED Triage Vitals [10/25/23 0754]   Temperature Pulse Respirations Blood Pressure SpO2   98.6 °F (37 °C) 67 18 133/72 98 %      Temp Source Heart Rate Source Patient Position - Orthostatic VS BP Location FiO2 (%)   Temporal Monitor Lying Left arm --      Pain Score       10 - Worst Possible Pain           Vitals:    10/25/23 0754 10/25/23 0800   BP: 133/72 133/72   Pulse: 67 62   Patient Position - Orthostatic VS: Lying Lying         Visual Acuity      ED Medications  Medications   iohexol (OMNIPAQUE) 350 MG/ML injection (MULTI-DOSE) 100 mL (100 mL Intravenous Given 10/25/23 0852)       Diagnostic Studies  Results Reviewed       Procedure Component Value Units Date/Time    Comprehensive metabolic panel [[id number]]  (Abnormal) Collected: 10/25/23 0810    Lab Status: Final result Specimen: Blood from Arm, Right Updated: 10/25/23 0838     Sodium 137 mmol/L      Potassium 4.1 mmol/L      Chloride 104 mmol/L      CO2 27 mmol/L      ANION GAP 6 mmol/L      BUN 13 mg/dL      Creatinine 0.55 mg/dL      Glucose 99 mg/dL      Calcium 9.2 mg/dL      AST 12 U/L      ALT 12 U/L      Alkaline Phosphatase 48 U/L      Total Protein 7.2 g/dL      Albumin 3.9 g/dL      Total Bilirubin 0.46 mg/dL      eGFR 124 ml/min/1.73sq m     Narrative:      [facility] guidelines for Chronic Kidney Disease (CKD):     Stage 1 with normal or high GFR (GFR > 90 mL/min/1.73 square meters)    Stage 2 Mild CKD (GFR = 60-89 mL/min/1.73 square meters)    Stage 3A Moderate CKD (GFR = 45-59 mL/min/1.73 square meters)    Stage 3B Moderate CKD (GFR = 30-44 mL/min/1.73 square meters)    Stage 4 Severe CKD (GFR = 15-29 mL/min/1.73 square meters)    Stage 5 End Stage CKD (GFR <15 mL/min/1.73 square meters)  Note: GFR calculation is accurate only with a steady state creatinine    Lipase [[id number]] (Normal) Collected: 10/25/23 0810    Lab Status: Final result Specimen: Blood from Arm, Right Updated: 10/25/23 0838     Lipase 23 u/L     UA w Reflex to Microscopic w Reflex to Culture [[id number]]  (Normal) Collected: 10/25/23 0751    Lab Status: Final result Specimen: Urine, Clean Catch Updated: 10/25/23 0823     Color, UA Yellow     Clarity, UA Clear     Specific Gravity, UA 1.015     pH, UA 7.5     Leukocytes, UA Negative     Nitrite, UA Negative     Protein, UA Negative mg/dl      Glucose, UA Negative mg/dl      Ketones, UA Negative mg/dl      Urobilinogen, UA 0.2 E.U./dl      Bilirubin, UA Negative     Occult Blood, UA Negative    CBC and differential [[id number]]  (Abnormal) Collected: 10/25/23 0810    Lab Status: Final result Specimen: Blood from Arm, Right Updated: 10/25/23 0817     WBC 11.19 Thousand/uL      RBC 4.00 Million/uL      Hemoglobin 12.3 g/dL      Hematocrit 39.4 %      MCV 99 fL      MCH 30.8 pg      MCHC 31.2 g/dL      RDW 13.0 %      MPV 8.5 fL      Platelets 320 Thousands/uL      nRBC 0 /100 WBCs      Neutrophils Relative 56 %      Immat GRANS % 0 %      Lymphocytes Relative 31 %      Monocytes Relative 7 %      Eosinophils Relative 5 %      Basophils Relative 1 %      Neutrophils Absolute 6.28 Thousands/µL      Immature Grans Absolute 0.04 Thousand/uL      Lymphocytes Absolute 3.49 Thousands/µL      Monocytes Absolute 0.73 Thousand/µL      Eosinophils Absolute 0.55 Thousand/µL      Basophils Absolute 0.10 Thousands/µL     POCT pregnancy, urine [[id number]]  (Normal) Resulted: 10/25/23 0811    Lab Status: Final result Updated: 10/25/23 0811     EXT Preg Test, Ur Negative     Control Valid                   US pelvis complete w transvaginal   Final Result by Ritchie Reyes MD (10/25 6899)      Normal. No evidence of ovarian torsion.                               Workstation performed: [id number]         CT abdomen pelvis with contrast   Final Result by Kandice Woodruff MD (10/25 6241)      No acute CT findings in the abdomen or pelvis. Additional findings as above. Workstation performed: [id number]                    Procedures  Procedures         ED Course  ED Course as of 10/25/23 1014   Wed Oct 25, 2023   0818 WBC(!): 11.19  Mild leukocytosis no obvious infectious etiology at this time may be reactive   0818 MCV(!): 99   0917 CT findings marked reviewed                               SBIRT 22yo+      Flowsheet Row Most Recent Value   Initial Alcohol Screen: US AUDIT-C     1. How often do you have a drink containing alcohol? 0 Filed at: 10/25/2023 0756   2. How many drinks containing alcohol do you have on a typical day you are drinking? 0 Filed at: 10/25/2023 0756   3a. Male UNDER 65: How often do you have five or more drinks on one occasion? 0 Filed at: 10/25/2023 0756   3b. FEMALE Any Age, or MALE 65+: How often do you have 4 or more drinks on one occassion? 0 Filed at: 10/25/2023 0756   Audit-C Score 0 Filed at: 10/25/2023 3830   JOSHUA: How many times in the past year have you. .. Used an illegal drug or used a prescription medication for non-medical reasons? Never Filed at: 10/25/2023 0756                      Medical Decision Making  43-year-old female presents with new onset left lower quadrant abdominal pain. Physical exam is reassuring however she does have mild amount of tenderness in the left lower quadrant. Reports history of ovarian cyst no other intra-abdominal history. Broad differential given patient is of childbearing age and has all intra-abdominal organs. Pregnancy, gynecological such as ovarian cyst torsion, diverticulitis atypical appendicitis, abscess. Proceed with broad work-up including abdominal scan of the abdomen as well as GYN ultrasound to rule out ovarian cyst.  No acute pathology noted. Findings discussed with patient advised to follow-up with OB/GYN and GI in the outpatient setting. Patient comfortable with plan. All questions answered.     Amount and/or Complexity of Data Reviewed  Independent Historian: parent  Labs: ordered. Decision-making details documented in ED Course. Radiology: ordered. Decision-making details documented in ED Course. ECG/medicine tests:  Decision-making details documented in ED Course. Risk  Prescription drug management. Disposition  Final diagnoses:   Abdominal pain     Time reflects when diagnosis was documented in both MDM as applicable and the Disposition within this note       Time User Action Codes Description Comment    10/25/2023 10:12 AM Maggie Garcia Add [R10.9] Abdominal pain           ED Disposition       ED Disposition   Discharge    Condition   Stable    Date/Time   Wed Oct 25, 2023 [address] discharge to home/self care. Follow-up Information       Follow up With Specialties Details Why Contact Info Additional Information    [facility]   [facility] [id number] [phone] [facility] [address] [place] [address], [id number] [phone]            Patient's Medications   Discharge Prescriptions    No medications on file       No discharge procedures on file.     PDMP Review       None            ED Provider  Electronically Signed by             Maggie Garcia MD  10/25/23 1014       Maggie Garcia MD  10/25/23 1014

## 2024-01-31 ENCOUNTER — OFFICE VISIT (OUTPATIENT)
Dept: URGENT CARE | Facility: CLINIC | Age: 34
End: 2024-01-31
Payer: COMMERCIAL

## 2024-01-31 ENCOUNTER — APPOINTMENT (OUTPATIENT)
Dept: RADIOLOGY | Facility: CLINIC | Age: 34
End: 2024-01-31
Payer: COMMERCIAL

## 2024-01-31 VITALS
TEMPERATURE: 97.8 F | HEART RATE: 97 BPM | OXYGEN SATURATION: 100 % | SYSTOLIC BLOOD PRESSURE: 116 MMHG | BODY MASS INDEX: 38.32 KG/M2 | WEIGHT: 230 LBS | DIASTOLIC BLOOD PRESSURE: 85 MMHG | RESPIRATION RATE: 18 BRPM | HEIGHT: 65 IN

## 2024-01-31 DIAGNOSIS — M25.512 ACUTE PAIN OF LEFT SHOULDER: Primary | ICD-10-CM

## 2024-01-31 DIAGNOSIS — M25.512 ACUTE PAIN OF LEFT SHOULDER: ICD-10-CM

## 2024-01-31 PROCEDURE — G0382 LEV 3 HOSP TYPE B ED VISIT: HCPCS | Performed by: PHYSICIAN ASSISTANT

## 2024-01-31 PROCEDURE — 73030 X-RAY EXAM OF SHOULDER: CPT
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
## 2024-04-27 ENCOUNTER — HOSPITAL ENCOUNTER (EMERGENCY)
Facility: HOSPITAL | Age: 34
Discharge: HOME | End: 2024-04-27
Attending: EMERGENCY MEDICINE | Admitting: EMERGENCY MEDICINE
Payer: MEDICAID

## 2024-04-27 ENCOUNTER — OFFICE VISIT (OUTPATIENT)
Dept: URGENT CARE | Facility: CLINIC | Age: 34
End: 2024-04-27
Payer: MEDICAID

## 2024-04-27 ENCOUNTER — APPOINTMENT (EMERGENCY)
Dept: CT IMAGING | Facility: HOSPITAL | Age: 34
End: 2024-04-27
Payer: MEDICAID

## 2024-04-27 ENCOUNTER — APPOINTMENT (EMERGENCY)
Dept: ULTRASOUND IMAGING | Facility: HOSPITAL | Age: 34
End: 2024-04-27
Payer: MEDICAID

## 2024-04-27 VITALS
DIASTOLIC BLOOD PRESSURE: 59 MMHG | TEMPERATURE: 97.6 F | HEIGHT: 65 IN | RESPIRATION RATE: 18 BRPM | SYSTOLIC BLOOD PRESSURE: 108 MMHG | OXYGEN SATURATION: 100 % | WEIGHT: 215 LBS | HEART RATE: 53 BPM | BODY MASS INDEX: 35.82 KG/M2

## 2024-04-27 VITALS
TEMPERATURE: 97.3 F | HEART RATE: 56 BPM | SYSTOLIC BLOOD PRESSURE: 111 MMHG | OXYGEN SATURATION: 99 % | RESPIRATION RATE: 18 BRPM | DIASTOLIC BLOOD PRESSURE: 64 MMHG

## 2024-04-27 DIAGNOSIS — R19.7 DIARRHEA: ICD-10-CM

## 2024-04-27 DIAGNOSIS — R10.9 ABDOMINAL PAIN: Primary | ICD-10-CM

## 2024-04-27 DIAGNOSIS — R11.2 NAUSEA AND VOMITING: ICD-10-CM

## 2024-04-27 DIAGNOSIS — R11.2 NAUSEA AND VOMITING, UNSPECIFIED VOMITING TYPE: ICD-10-CM

## 2024-04-27 DIAGNOSIS — R10.11 RUQ PAIN: Primary | ICD-10-CM

## 2024-04-27 LAB
ALBUMIN SERPL BCP-MCNC: 3.5 G/DL (ref 3.5–5)
ALP SERPL-CCNC: 35 U/L (ref 34–104)
ALT SERPL W P-5'-P-CCNC: 10 U/L (ref 7–52)
ANION GAP SERPL CALCULATED.3IONS-SCNC: 2 MMOL/L (ref 4–13)
AST SERPL W P-5'-P-CCNC: 12 U/L (ref 13–39)
BASOPHILS # BLD AUTO: 0.09 THOUSANDS/ÂΜL (ref 0–0.1)
BASOPHILS NFR BLD AUTO: 1 % (ref 0–1)
BILIRUB SERPL-MCNC: 0.4 MG/DL (ref 0.2–1)
BILIRUB UR QL STRIP: NEGATIVE
BUN SERPL-MCNC: 13 MG/DL (ref 5–25)
CALCIUM SERPL-MCNC: 8.4 MG/DL (ref 8.4–10.2)
CHLORIDE SERPL-SCNC: 107 MMOL/L (ref 96–108)
CLARITY UR: CLEAR
CO2 SERPL-SCNC: 26 MMOL/L (ref 21–32)
COLOR UR: YELLOW
CREAT SERPL-MCNC: 0.58 MG/DL (ref 0.6–1.3)
EOSINOPHIL # BLD AUTO: 0.17 THOUSAND/ÂΜL (ref 0–0.61)
EOSINOPHIL NFR BLD AUTO: 2 % (ref 0–6)
ERYTHROCYTE [DISTWIDTH] IN BLOOD BY AUTOMATED COUNT: 13.3 % (ref 11.6–15.1)
GFR SERPL CREATININE-BSD FRML MDRD: 121 ML/MIN/1.73SQ M
GLUCOSE SERPL-MCNC: 88 MG/DL (ref 65–140)
GLUCOSE UR STRIP-MCNC: NEGATIVE MG/DL
HCG SERPL QL: NEGATIVE
HCT VFR BLD AUTO: 40.5 % (ref 34.8–46.1)
HGB BLD-MCNC: 12.9 G/DL (ref 11.5–15.4)
HGB UR QL STRIP.AUTO: NEGATIVE
IMM GRANULOCYTES # BLD AUTO: 0.04 THOUSAND/UL (ref 0–0.2)
IMM GRANULOCYTES NFR BLD AUTO: 0 % (ref 0–2)
KETONES UR STRIP-MCNC: ABNORMAL MG/DL
LEUKOCYTE ESTERASE UR QL STRIP: NEGATIVE
LIPASE SERPL-CCNC: 6 U/L (ref 11–82)
LYMPHOCYTES # BLD AUTO: 3.11 THOUSANDS/ÂΜL (ref 0.6–4.47)
LYMPHOCYTES NFR BLD AUTO: 30 % (ref 14–44)
MCH RBC QN AUTO: 30.8 PG (ref 26.8–34.3)
MCHC RBC AUTO-ENTMCNC: 31.9 G/DL (ref 31.4–37.4)
MCV RBC AUTO: 97 FL (ref 82–98)
MONOCYTES # BLD AUTO: 0.74 THOUSAND/ÂΜL (ref 0.17–1.22)
MONOCYTES NFR BLD AUTO: 7 % (ref 4–12)
NEUTROPHILS # BLD AUTO: 6.37 THOUSANDS/ÂΜL (ref 1.85–7.62)
NEUTS SEG NFR BLD AUTO: 60 % (ref 43–75)
NITRITE UR QL STRIP: NEGATIVE
NRBC BLD AUTO-RTO: 0 /100 WBCS
PH UR STRIP.AUTO: 6.5 [PH]
PLATELET # BLD AUTO: 481 THOUSANDS/UL (ref 149–390)
PMV BLD AUTO: 8.6 FL (ref 8.9–12.7)
POTASSIUM SERPL-SCNC: 4.1 MMOL/L (ref 3.5–5.3)
PROT SERPL-MCNC: 5.9 G/DL (ref 6.4–8.4)
PROT UR STRIP-MCNC: NEGATIVE MG/DL
RBC # BLD AUTO: 4.19 MILLION/UL (ref 3.81–5.12)
SODIUM SERPL-SCNC: 135 MMOL/L (ref 135–147)
SP GR UR STRIP.AUTO: >=1.03
UROBILINOGEN UR QL STRIP.AUTO: 0.2 E.U./DL
WBC # BLD AUTO: 10.52 THOUSAND/UL (ref 4.31–10.16)

## 2024-04-27 PROCEDURE — 84703 CHORIONIC GONADOTROPIN ASSAY: CPT | Performed by: EMERGENCY MEDICINE

## 2024-04-27 PROCEDURE — G0382 LEV 3 HOSP TYPE B ED VISIT: HCPCS | Performed by: PHYSICIAN ASSISTANT

## 2024-04-27 PROCEDURE — 99284 EMERGENCY DEPT VISIT MOD MDM: CPT

## 2024-04-27 PROCEDURE — 85025 COMPLETE CBC W/AUTO DIFF WBC: CPT | Performed by: EMERGENCY MEDICINE

## 2024-04-27 PROCEDURE — 96361 HYDRATE IV INFUSION ADD-ON: CPT

## 2024-04-27 PROCEDURE — 36415 COLL VENOUS BLD VENIPUNCTURE: CPT | Performed by: EMERGENCY MEDICINE

## 2024-04-27 PROCEDURE — 96374 THER/PROPH/DIAG INJ IV PUSH: CPT

## 2024-04-27 PROCEDURE — 99285 EMERGENCY DEPT VISIT HI MDM: CPT | Performed by: EMERGENCY MEDICINE

## 2024-04-27 PROCEDURE — 96375 TX/PRO/DX INJ NEW DRUG ADDON: CPT

## 2024-04-27 PROCEDURE — 74177 CT ABD & PELVIS W/CONTRAST: CPT

## 2024-04-27 PROCEDURE — 80053 COMPREHEN METABOLIC PANEL: CPT | Performed by: EMERGENCY MEDICINE

## 2024-04-27 PROCEDURE — 99213 OFFICE O/P EST LOW 20 MIN: CPT | Performed by: PHYSICIAN ASSISTANT

## 2024-04-27 PROCEDURE — 76705 ECHO EXAM OF ABDOMEN: CPT

## 2024-04-27 PROCEDURE — 83690 ASSAY OF LIPASE: CPT | Performed by: EMERGENCY MEDICINE

## 2024-04-27 PROCEDURE — 81003 URINALYSIS AUTO W/O SCOPE: CPT | Performed by: EMERGENCY MEDICINE

## 2024-04-27 RX ORDER — ONDANSETRON 2 MG/ML
4 INJECTION INTRAMUSCULAR; INTRAVENOUS ONCE
Status: COMPLETED | OUTPATIENT
Start: 2024-04-27 | End: 2024-04-27

## 2024-04-27 RX ORDER — ONDANSETRON 4 MG/1
4 TABLET, ORALLY DISINTEGRATING ORAL EVERY 6 HOURS PRN
Qty: 12 TABLET | Refills: 0 | Status: SHIPPED | OUTPATIENT
Start: 2024-04-27 | End: 2024-04-30

## 2024-04-27 RX ORDER — MORPHINE SULFATE 4 MG/ML
4 INJECTION, SOLUTION INTRAMUSCULAR; INTRAVENOUS ONCE
Status: COMPLETED | OUTPATIENT
Start: 2024-04-27 | End: 2024-04-27

## 2024-04-27 RX ORDER — KETOROLAC TROMETHAMINE 30 MG/ML
15 INJECTION, SOLUTION INTRAMUSCULAR; INTRAVENOUS ONCE
Status: COMPLETED | OUTPATIENT
Start: 2024-04-27 | End: 2024-04-27

## 2024-04-27 RX ADMIN — MORPHINE SULFATE 4 MG: 4 INJECTION INTRAVENOUS at 16:49

## 2024-04-27 RX ADMIN — ONDANSETRON 4 MG: 2 INJECTION INTRAMUSCULAR; INTRAVENOUS at 13:03

## 2024-04-27 RX ADMIN — IOHEXOL 100 ML: 350 INJECTION, SOLUTION INTRAVENOUS at 16:42

## 2024-04-27 RX ADMIN — KETOROLAC TROMETHAMINE 15 MG: 30 INJECTION, SOLUTION INTRAMUSCULAR at 13:02

## 2024-04-27 RX ADMIN — SODIUM CHLORIDE 1000 ML: 0.9 INJECTION, SOLUTION INTRAVENOUS at 16:49

## 2024-04-27 NOTE — PATIENT INSTRUCTIONS
Recommend evaluation in the emergency room due to right upper quadrant pain.  Symptoms are more consistent with acute gastroenteritis but with the right upper quadrant pain cannot rule out cholecystitis, hepatitis.

## 2024-04-27 NOTE — ED PROVIDER NOTES
History  Chief Complaint   Patient presents with    Abdominal Pain    Vomiting     Accompanied by chills, began overnight    Diarrhea     Black in color, began this morning     32 yo female presenting for 24 hours of right-sided abdominal pain predominantly in the right upper quadrant rating to the right flank.  With nausea vomiting diarrhea.  Made worse with eating.  States she is a strong family history of gallbladder disease.  Patient states she is never had surgery herself.  Denies any obvious sick contacts.  States he is also having chills and bodyaches but denies any fevers.  Tried Zofran yesterday at work but immediately threw it up.  States that her last menstrual period ended a few days ago and that she is not possibly pregnant.        Prior to Admission Medications   Prescriptions Last Dose Informant Patient Reported? Taking?   naproxen (Naprosyn) 500 mg tablet   No No   Sig: Take 1 tablet (500 mg total) by mouth 2 (two) times a day with meals   Patient not taking: Reported on 1/31/2024   oxyCODONE (ROXICODONE) 10 MG TABS   No No   Sig: Take 0.5 tablets (5 mg total) by mouth every 4 (four) hours as needed for moderate pain for up to 4 doses Max Daily Amount: 30 mg   Patient not taking: Reported on 1/31/2024      Facility-Administered Medications: None       History reviewed. No pertinent past medical history.    History reviewed. No pertinent surgical history.    History reviewed. No pertinent family history.  I have reviewed and agree with the history as documented.    E-Cigarette/Vaping    E-Cigarette Use Never User      E-Cigarette/Vaping Substances     Social History     Tobacco Use    Smoking status: Never    Smokeless tobacco: Never   Vaping Use    Vaping status: Never Used   Substance Use Topics    Alcohol use: Yes     Comment: rare    Drug use: Yes     Types: Marijuana     Comment: medical card       Review of Systems   Constitutional:  Positive for chills.   Gastrointestinal:  Positive for  abdominal pain, diarrhea, nausea and vomiting.   Musculoskeletal:  Positive for myalgias.   All other systems reviewed and are negative.      Physical Exam  Physical Exam  Vitals and nursing note reviewed.   Constitutional:       General: She is not in acute distress.     Appearance: She is well-developed. She is not diaphoretic.   HENT:      Head: Normocephalic and atraumatic.      Right Ear: External ear normal.      Left Ear: External ear normal.      Nose: Nose normal.   Eyes:      General: No scleral icterus.        Right eye: No discharge.         Left eye: No discharge.      Conjunctiva/sclera: Conjunctivae normal.      Pupils: Pupils are equal, round, and reactive to light.   Neck:      Vascular: No JVD.      Trachea: No tracheal deviation.   Cardiovascular:      Rate and Rhythm: Normal rate and regular rhythm.      Heart sounds: Normal heart sounds. No murmur heard.     No friction rub. No gallop.   Pulmonary:      Effort: Pulmonary effort is normal. No respiratory distress.      Breath sounds: Normal breath sounds. No stridor. No wheezing or rales.   Abdominal:      General: Bowel sounds are normal. There is no distension.      Palpations: Abdomen is soft. There is no mass.      Tenderness: There is abdominal tenderness in the right upper quadrant and epigastric area. There is right CVA tenderness. There is no left CVA tenderness or guarding.   Musculoskeletal:         General: No tenderness or deformity. Normal range of motion.      Cervical back: Normal range of motion and neck supple.   Skin:     General: Skin is warm and dry.      Coloration: Skin is not pale.      Findings: No erythema or rash.   Neurological:      Mental Status: She is alert and oriented to person, place, and time.      Cranial Nerves: No cranial nerve deficit.      Sensory: No sensory deficit.      Motor: No abnormal muscle tone.   Psychiatric:         Behavior: Behavior normal.         Thought Content: Thought content normal.          Judgment: Judgment normal.         Vital Signs  ED Triage Vitals [04/27/24 1155]   Temperature Pulse Respirations Blood Pressure SpO2   97.6 °F (36.4 °C) 55 18 117/77 99 %      Temp Source Heart Rate Source Patient Position - Orthostatic VS BP Location FiO2 (%)   Temporal Monitor Lying Left arm --      Pain Score       10 - Worst Possible Pain           Vitals:    04/27/24 1155 04/27/24 1308 04/27/24 1649   BP: 117/77 109/67 96/54   Pulse: 55 62 59   Patient Position - Orthostatic VS: Lying           Visual Acuity      ED Medications  Medications   sodium chloride 0.9 % bolus 1,000 mL (1,000 mL Intravenous New Bag 4/27/24 1649)   ketorolac (TORADOL) injection 15 mg (15 mg Intravenous Given 4/27/24 1302)   ondansetron (ZOFRAN) injection 4 mg (4 mg Intravenous Given 4/27/24 1303)   morphine injection 4 mg (4 mg Intravenous Given 4/27/24 1649)   iohexol (OMNIPAQUE) 350 MG/ML injection (MULTI-DOSE) 100 mL (100 mL Intravenous Given 4/27/24 1642)       Diagnostic Studies  Results Reviewed       Procedure Component Value Units Date/Time    hCG, qualitative pregnancy [[id number]]  (Normal) Collected: 04/27/24 1252    Lab Status: Final result Specimen: Blood from Arm, Left Updated: 04/27/24 1329     Preg, Serum Negative    Comprehensive metabolic panel [[id number]]  (Abnormal) Collected: 04/27/24 1252    Lab Status: Final result Specimen: Blood from Arm, Left Updated: 04/27/24 1324     Sodium 135 mmol/L      Potassium 4.1 mmol/L      Chloride 107 mmol/L      CO2 26 mmol/L      ANION GAP 2 mmol/L      BUN 13 mg/dL      Creatinine 0.58 mg/dL      Glucose 88 mg/dL      Calcium 8.4 mg/dL      AST 12 U/L      ALT 10 U/L      Alkaline Phosphatase 35 U/L      Total Protein 5.9 g/dL      Albumin 3.5 g/dL      Total Bilirubin 0.40 mg/dL      eGFR 121 ml/min/1.73sq m     Narrative:      National Kid[employer] Disease Foundation guidelines for Chronic Kidney Disease (CKD):     Stage 1 with normal or high GFR (GFR > 90 mL/min/1.73 square  meters)    Stage 2 Mild CKD (GFR = 60-89 mL/min/1.73 square meters)    Stage 3A Moderate CKD (GFR = 45-59 mL/min/1.73 square meters)    Stage 3B Moderate CKD (GFR = 30-44 mL/min/1.73 square meters)    Stage 4 Severe CKD (GFR = 15-29 mL/min/1.73 square meters)    Stage 5 End Stage CKD (GFR <15 mL/min/1.73 square meters)  Note: GFR calculation is accurate only with a steady state creatinine    Lipase [[id number]]  (Abnormal) Collected: 04/27/24 1252    Lab Status: Final result Specimen: Blood from Arm, Left Updated: 04/27/24 1324     Lipase 6 u/L     UA w Reflex to Microscopic w Reflex to Culture [[id number]]  (Abnormal) Collected: 04/27/24 1301    Lab Status: Final result Specimen: Urine, Clean Catch Updated: 04/27/24 1307     Color, UA Yellow     Clarity, UA Clear     Specific Gravity, UA >=1.030     pH, UA 6.5     Leukocytes, UA Negative     Nitrite, UA Negative     Protein, UA Negative mg/dl      Glucose, UA Negative mg/dl      Ketones, UA Trace mg/dl      Urobilinogen, UA 0.2 E.U./dl      Bilirubin, UA Negative     Occult Blood, UA Negative    CBC and differential [[id number]]  (Abnormal) Collected: 04/27/24 1252    Lab Status: Final result Specimen: Blood from Arm, Left Updated: 04/27/24 1258     WBC 10.52 Thousand/uL      RBC 4.19 Million/uL      Hemoglobin 12.9 g/dL      Hematocrit 40.5 %      MCV 97 fL      MCH 30.8 pg      MCHC 31.9 g/dL      RDW 13.3 %      MPV 8.6 fL      Platelets 481 Thousands/uL      nRBC 0 /100 WBCs      Segmented % 60 %      Immature Grans % 0 %      Lymphocytes % 30 %      Monocytes % 7 %      Eosinophils Relative 2 %      Basophils Relative 1 %      Absolute Neutrophils 6.37 Thousands/µL      Absolute Immature Grans 0.04 Thousand/uL      Absolute Lymphocytes 3.11 Thousands/µL      Absolute Monocytes 0.74 Thousand/µL      Eosinophils Absolute 0.17 Thousand/µL      Basophils Absolute 0.09 Thousands/µL                    CT abdomen pelvis with contrast   Final Result by Elicia Vera  MD Jaylyn (04/27 1736)      No acute abdominopelvic abnormality identified.         Workstation performed: [id number]         US right upper quadrant   Final Result by Carlos A Beckett MD (04/27 1622)      No gallstones. Normal ducts.      I have personally reviewed this study including all images.  / DEREK      Resident: MILAN Marie I, the attending radiologist, have reviewed the images and agree with the final report above.      Workstation performed: [id number]                    Procedures  Procedures         ED Course  ED Course as of 04/27/24 1756   Sat Apr 27, 2024   1628 Discussed results with patient and stating she is still in a lot of pain, offered CT scan and patient stating that she would like to move forward with CT scan as she is still in considerable pain, will also give morphine.    1753 Reviewed all results with patient bedside.  She does states she is feeling somewhat better at this time.  Will send Zofran and a work note.                                             Medical Decision Making  32 yo otherwise healthy female presenting with RUQ abdominal pain, N/V/D with no obvious sick contacts and a strong family history for gallbladder disease and surgery. Check bloodwork, RUQ us, UA as patient with R CVA tenderness. Pain and nausea control.     Amount and/or Complexity of Data Reviewed  Labs: ordered.  Radiology: ordered.    Risk  Prescription drug management.             Disposition  Final diagnoses:   Abdominal pain   Nausea and vomiting   Diarrhea     Time reflects when diagnosis was documented in both MDM as applicable and the Disposition within this note       Time User Action Codes Description Comment    4/27/2024  5:48 PM Keith Grant Add [R10.9] Abdominal pain     4/27/2024  5:48 PM Keith Garnt Add [R11.2] Nausea and vomiting     4/27/2024  5:48 PM Keith Grant Add [R19.7] Diarrhea           ED Disposition       ED Disposition   Discharge    Condition    Stable    Date/Time   Sat Apr 27, 2024  5:48 PM    Comment   Milka Bender discharge to home/self care.                   Follow-up Information       Follow up With Specialties Details Why Contact Info Additional Information    Your Primary Care Doctor  Go to        [facility] Emergency Department Emergency Medicine Go to  As needed, If symptoms worsen [address] [place] [address] [phone] [facility] Emergency Department, [address], [place], [place] [address]            Patient's Medications   Discharge Prescriptions    ONDANSETRON (ZOFRAN ODT) 4 MG DISINTEGRATING TABLET    Take 1 tablet (4 mg total) by mouth every 6 (six) hours as needed for nausea or vomiting for up to 3 days       Start Date: 4/27/2024 End Date: 4/30/2024       Order Dose: 4 mg       Quantity: 12 tablet    Refills: 0       No discharge procedures on file.    PDMP Review       None            ED Provider  Electronically Signed by             Ketih Grant DO  04/27/24 3599

## 2024-04-27 NOTE — PROGRESS NOTES
[facility]    NAME: Milka Bender is a 33 y.o. female  : 1990    MRN: [id number]  DATE: 2024  TIME: 11:12 AM    Assessment and Plan   RUQ pain [R10.11]  1. RUQ pain        2. Nausea and vomiting, unspecified vomiting type            Patient Instructions     Patient Instructions   Recommend evaluation in the emergency room due to right upper quadrant pain.  Symptoms are more consistent with acute gastroenteritis but with the right upper quadrant pain cannot rule out cholecystitis, hepatitis.    Chief Complaint     Chief Complaint   Patient presents with    Vomiting     Along with diarrhea, black tarry stools and achy muscles, started last night        History of Present Illness   33-year-old female here with complaint of bilious vomiting that started last night.  Also had some diarrhea with dark tarry like stools.  She is reporting pain in her right upper quadrant and muscle aches.  Feels feverish and has chills.        Review of Systems   Review of Systems   Constitutional:  Negative for activity change, appetite change, chills, fatigue and fever.   Respiratory:  Negative for cough.    Cardiovascular:  Negative for chest pain.   Gastrointestinal:  Positive for abdominal pain, diarrhea, nausea and vomiting. Negative for constipation.   Genitourinary:  Negative for difficulty urinating, dysuria, flank pain, frequency, hematuria and urgency.   Musculoskeletal:  Negative for back pain and myalgias.   All other systems reviewed and are negative.      Current Medications     Current Outpatient Medications:     naproxen (Naprosyn) 500 mg tablet, Take 1 tablet (500 mg total) by mouth 2 (two) times a day with meals (Patient not taking: Reported on 2024), Disp: 30 tablet, Rfl: 0    oxyCODONE (ROXICODONE) 10 MG TABS, Take 0.5 tablets (5 mg total) by mouth every 4 (four) hours as needed for moderate pain for up to 4 doses Max Daily Amount: 30 mg (Patient not taking: Reported on 2024), Disp: 2  tablet, Rfl: 0    Current Allergies     Allergies as of 04/27/2024    (No Known Allergies)          The following portions of the patient's history were reviewed and updated as appropriate: allergies, current medications, past family history, past medical history, past social history, past surgical history and problem list.   History reviewed. No pertinent past medical history.  History reviewed. No pertinent surgical history.  History reviewed. No pertinent family history.  Social History     Socioeconomic History    Marital status: Single     Spouse name: Not on file    Number of children: Not on file    Years of education: Not on file    Highest education level: Not on file   Occupational History    Not on file   Tobacco Use    Smoking status: Never    Smokeless tobacco: Never   Vaping Use    Vaping status: Never Used   Substance and Sexual Activity    Alcohol use: Yes     Comment: rare    Drug use: Yes     Types: Marijuana     Comment: medical card    Sexual activity: Yes     Partners: Female   Other Topics Concern    Not on file   Social History Narrative    Not on file     Social Determinants of Health     Financial Resource Strain: Not on file   Food Insecurity: Not on file   Transportation Needs: Not on file   Physical Activity: Not on file   Stress: Not on file   Social Connections: Not on file   Intimate Partner Violence: Not on file   Housing Stability: Not on file     Medications have been verified.    Objective   /64   Pulse 56   Temp (!) 97.3 °F (36.3 °C)   Resp 18   SpO2 99%      Physical Exam   Physical Exam  Vitals and nursing note reviewed.   Constitutional:       General: She is not in acute distress.     Appearance: Normal appearance. She is well-developed.   HENT:      Head: Normocephalic and atraumatic.   Cardiovascular:      Rate and Rhythm: Normal rate and regular rhythm.      Heart sounds: Normal heart sounds. No murmur heard.  Pulmonary:      Effort: Pulmonary effort is normal. No  respiratory distress.      Breath sounds: Normal breath sounds.   Abdominal:      General: Bowel sounds are normal.      Tenderness: There is abdominal tenderness in the right upper quadrant. There is guarding. Positive signs include Rosenthal's sign.

## 2024-04-27 NOTE — Clinical Note
Milka Bender was seen and treated in our emergency department on 4/27/2024.                Diagnosis:     Milka  may return to work on return date.    She may return on this date: 04/30/2024         If you have any questions or concerns, please don't hesitate to call.      Keith Grant, DO    ______________________________           _______________          _______________  Hospital Representative                              Date                                Time

## 2024-06-07 ENCOUNTER — APPOINTMENT (OUTPATIENT)
Dept: URGENT CARE | Facility: MEDICAL CENTER | Age: 34
End: 2024-06-07

## 2024-06-07 ENCOUNTER — APPOINTMENT (OUTPATIENT)
Dept: PHYSICAL THERAPY | Facility: MEDICAL CENTER | Age: 34
End: 2024-06-07

## 2024-06-07 ENCOUNTER — APPOINTMENT (OUTPATIENT)
Dept: LAB | Facility: MEDICAL CENTER | Age: 34
End: 2024-06-07

## 2024-06-07 DIAGNOSIS — Z02.1 ENCOUNTER FOR PRE-EMPLOYMENT HEALTH SCREENING EXAMINATION: ICD-10-CM

## 2024-06-07 DIAGNOSIS — Z02.1 ENCOUNTER FOR PRE-EMPLOYMENT HEALTH SCREENING EXAMINATION: Primary | ICD-10-CM

## 2024-06-07 LAB
HBV SURFACE AB SER-ACNC: 24.5 MIU/ML
MEV IGG SER QL IA: NORMAL
MUV IGG SER QL IA: NORMAL
RUBV IGG SERPL IA-ACNC: 92.9 IU/ML
VZV IGG SER QL IA: NORMAL

## 2024-06-07 PROCEDURE — 97530 THERAPEUTIC ACTIVITIES: CPT | Performed by: PHYSICAL THERAPIST

## 2024-06-07 PROCEDURE — 86765 RUBEOLA ANTIBODY: CPT

## 2024-06-07 PROCEDURE — 86735 MUMPS ANTIBODY: CPT

## 2024-06-07 PROCEDURE — 86762 RUBELLA ANTIBODY: CPT

## 2024-06-07 PROCEDURE — 86787 VARICELLA-ZOSTER ANTIBODY: CPT

## 2024-06-07 PROCEDURE — 36415 COLL VENOUS BLD VENIPUNCTURE: CPT

## 2024-06-07 PROCEDURE — 86706 HEP B SURFACE ANTIBODY: CPT

## 2024-06-07 PROCEDURE — 86480 TB TEST CELL IMMUN MEASURE: CPT

## 2024-06-08 LAB
GAMMA INTERFERON BACKGROUND BLD IA-ACNC: 0.04 IU/ML
M TB IFN-G BLD-IMP: NEGATIVE
M TB IFN-G CD4+ BCKGRND COR BLD-ACNC: 0 IU/ML
M TB IFN-G CD4+ BCKGRND COR BLD-ACNC: 0.01 IU/ML
MITOGEN IGNF BCKGRD COR BLD-ACNC: 9.96 IU/ML

## 2024-07-17 NOTE — ED PROVIDER NOTE - OBJECTIVE STATEMENT
"    Caller: Joanna Cross \"SIXTO\"    Relationship: Self    Best call back number: [phone]     What orders are you requesting (i.e. lab or imaging): NEEDS BNP AND RENAL FUNCTION PANEL ADDED TO THE LABS ALREADY SUBMITTED. PATIENT WILL BE COMING IN MONDAY TO HAVE THOSE DONE PRIOR TO HER APPT ON 7/25/24 SO THEY CAN BE DISCUSSED AT THAT TIME    In what timeframe would the patient need to come in: ASAP          "
30 y/o female with no significant PMH who presents with B/L lower quadrant abdominal pain and B/L flank pain. Reports that she started experiencing RLQ abdominal pain last night and pain has slowly radiated to her LLQ and B/L flank area; pain is pressuring, constant, and there is no alleviating or worsening factor. Also endorses pressuring sensation on her genitalia when she urinates since last night and chronic N/V that she will see a GI next month. Denies fever, SOB, chest pain, hematuria, hx of kidney stones, hematochezia, and melena. Also denies vaginal discharge or bleeding. Pt never had abdominal surgery before.
Fellow